# Patient Record
Sex: FEMALE | Race: BLACK OR AFRICAN AMERICAN | URBAN - METROPOLITAN AREA
[De-identification: names, ages, dates, MRNs, and addresses within clinical notes are randomized per-mention and may not be internally consistent; named-entity substitution may affect disease eponyms.]

---

## 2022-03-18 PROBLEM — D64.9 ANEMIA: Status: ACTIVE | Noted: 2021-03-07

## 2022-11-15 PROBLEM — Z76.89 ENCOUNTER TO ESTABLISH CARE WITH NEW DOCTOR: Status: ACTIVE | Noted: 2022-11-15

## 2022-11-16 PROBLEM — H53.8 BLURRING OF VISUAL IMAGE: Status: ACTIVE | Noted: 2022-11-03

## 2022-11-16 PROBLEM — I51.9 HEART DISEASE: Status: ACTIVE | Noted: 2022-11-16

## 2022-11-16 PROBLEM — E11.9 DIABETES MELLITUS (HCC): Status: ACTIVE | Noted: 2022-11-16

## 2022-11-30 PROBLEM — Z12.31 SCREENING MAMMOGRAM FOR BREAST CANCER: Status: ACTIVE | Noted: 2022-11-30

## 2023-02-28 DIAGNOSIS — Z13.220 SCREENING, LIPID: ICD-10-CM

## 2023-02-28 DIAGNOSIS — R00.2 PALPITATIONS: ICD-10-CM

## 2023-02-28 DIAGNOSIS — D64.9 ANEMIA, UNSPECIFIED TYPE: ICD-10-CM

## 2023-02-28 DIAGNOSIS — E11.9 TYPE 2 DIABETES MELLITUS WITHOUT COMPLICATION, WITHOUT LONG-TERM CURRENT USE OF INSULIN (HCC): ICD-10-CM

## 2023-02-28 LAB
ALBUMIN SERPL-MCNC: 3.8 G/DL (ref 3.5–5)
ALBUMIN/GLOB SERPL: 1.2 (ref 0.4–1.6)
ALP SERPL-CCNC: 82 U/L (ref 50–136)
ALT SERPL-CCNC: 35 U/L (ref 12–65)
ANION GAP SERPL CALC-SCNC: 0 MMOL/L (ref 2–11)
AST SERPL-CCNC: 28 U/L (ref 15–37)
BASOPHILS # BLD: 0 K/UL (ref 0–0.2)
BASOPHILS NFR BLD: 1 % (ref 0–2)
BILIRUB SERPL-MCNC: 0.6 MG/DL (ref 0.2–1.1)
BUN SERPL-MCNC: 14 MG/DL (ref 6–23)
CALCIUM SERPL-MCNC: 9.1 MG/DL (ref 8.3–10.4)
CHLORIDE SERPL-SCNC: 113 MMOL/L (ref 101–110)
CHOLEST SERPL-MCNC: 242 MG/DL
CO2 SERPL-SCNC: 28 MMOL/L (ref 21–32)
CREAT SERPL-MCNC: 0.7 MG/DL (ref 0.6–1)
CREAT UR-MCNC: 105 MG/DL
DIFFERENTIAL METHOD BLD: ABNORMAL
EOSINOPHIL # BLD: 0 K/UL (ref 0–0.8)
EOSINOPHIL NFR BLD: 1 % (ref 0.5–7.8)
ERYTHROCYTE [DISTWIDTH] IN BLOOD BY AUTOMATED COUNT: 13.7 % (ref 11.9–14.6)
EST. AVERAGE GLUCOSE BLD GHB EST-MCNC: 120 MG/DL
GLOBULIN SER CALC-MCNC: 3.2 G/DL (ref 2.8–4.5)
GLUCOSE SERPL-MCNC: 105 MG/DL (ref 65–100)
HBA1C MFR BLD: 5.8 % (ref 4.8–5.6)
HCT VFR BLD AUTO: 34 % (ref 35.8–46.3)
HDLC SERPL-MCNC: 49 MG/DL (ref 40–60)
HDLC SERPL: 4.9
HGB BLD-MCNC: 10.3 G/DL (ref 11.7–15.4)
IMM GRANULOCYTES # BLD AUTO: 0 K/UL (ref 0–0.5)
IMM GRANULOCYTES NFR BLD AUTO: 1 % (ref 0–5)
LDLC SERPL CALC-MCNC: 177.2 MG/DL
LYMPHOCYTES # BLD: 2.2 K/UL (ref 0.5–4.6)
LYMPHOCYTES NFR BLD: 49 % (ref 13–44)
MCH RBC QN AUTO: 22.9 PG (ref 26.1–32.9)
MCHC RBC AUTO-ENTMCNC: 30.3 G/DL (ref 31.4–35)
MCV RBC AUTO: 75.7 FL (ref 82–102)
MICROALBUMIN UR-MCNC: 0.77 MG/DL (ref 0–3)
MICROALBUMIN/CREAT UR-RTO: 7 MG/G (ref 0–30)
MONOCYTES # BLD: 0.4 K/UL (ref 0.1–1.3)
MONOCYTES NFR BLD: 8 % (ref 4–12)
NEUTS SEG # BLD: 1.8 K/UL (ref 1.7–8.2)
NEUTS SEG NFR BLD: 41 % (ref 43–78)
NRBC # BLD: 0 K/UL (ref 0–0.2)
PLATELET # BLD AUTO: 223 K/UL (ref 150–450)
PMV BLD AUTO: 11.1 FL (ref 9.4–12.3)
POTASSIUM SERPL-SCNC: 4 MMOL/L (ref 3.5–5.1)
PROT SERPL-MCNC: 7 G/DL (ref 6.3–8.2)
RBC # BLD AUTO: 4.49 M/UL (ref 4.05–5.2)
SODIUM SERPL-SCNC: 141 MMOL/L (ref 133–143)
TRIGL SERPL-MCNC: 79 MG/DL (ref 35–150)
TSH, 3RD GENERATION: 0.68 UIU/ML (ref 0.36–3.74)
VLDLC SERPL CALC-MCNC: 15.8 MG/DL (ref 6–23)
WBC # BLD AUTO: 4.4 K/UL (ref 4.3–11.1)

## 2023-04-19 NOTE — PROGRESS NOTES
Name: Sunshine Rodrigues  Date: 2023  YOB: 1965  LMP: No LMP recorded. Patient is postmenopausal.      Beulah Hernandez is a 56 y.o.   who is here for a annual exam. New type 2 DM, doing great! Birth control: postmenopausal  Menstrual status: none  Gyn Surgery: s/p RSO and s/p LSO    Health Maintenance:  Pap Smear: last pap in 22, pathology WNL  Any history of abnormal pap smear? yes  Mammo: last mammo in  (?), pathology WNL  HPV vaccine: not done  Colonoscopy: normal in 2018 (?)  Dexa scan: not applicable    Review of Systems   Constitutional: Negative. HENT: Negative. Eyes: Negative. Respiratory: Negative. Cardiovascular: Negative. Gastrointestinal: Negative. Endocrine: Negative. Genitourinary: Negative. Musculoskeletal: Negative. Skin: Negative. Allergic/Immunologic: Negative. Neurological: Negative. Hematological: Negative. Psychiatric/Behavioral: Negative. Negative for self-injury and suicidal ideas. Past Medical History:  No date: Abnormal Papanicolaou smear of cervix  No date: Endometriosis  No date: Type 2 diabetes mellitus without complication ([facility] [address]     Past Surgical History:  No date: OVARY REMOVAL; Right  No date: SALPINGECTOMY       Current Outpatient Medications:     glipiZIDE (GLUCOTROL) 5 MG tablet, Take 1 tablet by mouth 2 times daily, Disp: 60 tablet, Rfl: 3    Family Cancer History:   Cancer-related family history includes Breast Cancer (age of onset: 79) in her mother. There is no history of Colon Cancer or Ovarian Cancer. Social History:  reports that she has never smoked. She has never used smokeless tobacco. She reports that she does not currently use alcohol. She reports that she does not use drugs. Ob History:  No obstetric history on file. Sexual History:  reports being sexually active and has had partner(s) who are male. She reports using the following method of birth control/protection: None.     PHYSICAL

## 2023-04-20 ENCOUNTER — OFFICE VISIT (OUTPATIENT)
Dept: OBGYN CLINIC | Age: 58
End: 2023-04-20
Payer: COMMERCIAL

## 2023-04-20 VITALS — WEIGHT: 157 LBS | HEIGHT: 63 IN | BODY MASS INDEX: 27.82 KG/M2

## 2023-04-20 DIAGNOSIS — Z12.4 PAP SMEAR FOR CERVICAL CANCER SCREENING: ICD-10-CM

## 2023-04-20 DIAGNOSIS — Z01.419 WELL WOMAN EXAM WITH ROUTINE GYNECOLOGICAL EXAM: Primary | ICD-10-CM

## 2023-04-20 DIAGNOSIS — Z12.31 ENCOUNTER FOR SCREENING MAMMOGRAM FOR MALIGNANT NEOPLASM OF BREAST: ICD-10-CM

## 2023-04-20 DIAGNOSIS — Z11.51 SCREENING FOR HPV (HUMAN PAPILLOMAVIRUS): ICD-10-CM

## 2023-04-20 PROCEDURE — 99396 PREV VISIT EST AGE 40-64: CPT | Performed by: OBSTETRICS & GYNECOLOGY

## 2023-04-20 ASSESSMENT — ENCOUNTER SYMPTOMS
EYES NEGATIVE: 1
GASTROINTESTINAL NEGATIVE: 1
ALLERGIC/IMMUNOLOGIC NEGATIVE: 1
RESPIRATORY NEGATIVE: 1

## 2023-05-01 LAB
CYTOLOGIST CVX/VAG CYTO: ABNORMAL
CYTOLOGY CVX/VAG DOC THIN PREP: ABNORMAL
HPV APTIMA: NEGATIVE
HPV GENOTYPE REFLEX: ABNORMAL
Lab: ABNORMAL
PATH REPORT.FINAL DX SPEC: ABNORMAL
PATHOLOGIST CVX/VAG CYTO: ABNORMAL
PATHOLOGIST PROVIDED ICD: ABNORMAL
RECOM F/U CVX/VAG CYTO: ABNORMAL
STAT OF ADQ CVX/VAG CYTO-IMP: ABNORMAL

## 2023-06-19 ENCOUNTER — TELEPHONE (OUTPATIENT)
Dept: PRIMARY CARE CLINIC | Facility: CLINIC | Age: 58
End: 2023-06-19

## 2023-07-03 ENCOUNTER — COMMUNITY OUTREACH (OUTPATIENT)
Dept: PRIMARY CARE CLINIC | Facility: CLINIC | Age: 58
End: 2023-07-03

## 2023-08-22 ENCOUNTER — INITIAL CONSULT (OUTPATIENT)
Age: 58
End: 2023-08-22
Payer: COMMERCIAL

## 2023-08-22 VITALS
BODY MASS INDEX: 28.35 KG/M2 | SYSTOLIC BLOOD PRESSURE: 132 MMHG | HEART RATE: 65 BPM | DIASTOLIC BLOOD PRESSURE: 80 MMHG | HEIGHT: 63 IN | WEIGHT: 160 LBS

## 2023-08-22 DIAGNOSIS — E78.2 MIXED HYPERLIPIDEMIA: ICD-10-CM

## 2023-08-22 DIAGNOSIS — R00.2 HEART PALPITATIONS: Primary | ICD-10-CM

## 2023-08-22 PROCEDURE — 99244 OFF/OP CNSLTJ NEW/EST MOD 40: CPT | Performed by: INTERNAL MEDICINE

## 2023-08-22 PROCEDURE — 93000 ELECTROCARDIOGRAM COMPLETE: CPT | Performed by: INTERNAL MEDICINE

## 2023-08-22 ASSESSMENT — ENCOUNTER SYMPTOMS
ORTHOPNEA: 0
BLURRED VISION: 0
ALLERGIC/IMMUNOLOGIC NEGATIVE: 1
SHORTNESS OF BREATH: 0
EYES NEGATIVE: 1
LEFT EYE: 0
GASTROINTESTINAL NEGATIVE: 1
COUGH: 0
RIGHT EYE: 0
RESPIRATORY NEGATIVE: 1

## 2023-08-22 NOTE — PROGRESS NOTES
[address] [place]     [address] [facility], [address] [place] [address]      Patient:  Peri Carlos  1965       SUBJECTIVE:  Peri Carlos is a  56 y.o. female seen for a visit regarding the following:     Chief Complaint   Patient presents with    Consultation    Palpitations     CC: palpitations    HPI:   56 y.o. female  with a history of HLD, DM who is here for palpitations consult    Patient reports that  she has been having palpitations for many years, since Nov 2022 when she was diagnosed with DM. Notices most when she gets into bed. Feels irregular, fast. Waits and symptoms stop. Lasts a few minutes at a time she reports. No obvious alleviating or aggravating factors identified. Denies associated shortness of breath, chest pain or chest pressure, dizziness, lightheadedness, syncopal events, abdominal pain, nausea, vomiting, diaphoresis. Symptoms have been occurring nearly every day. No ER visits or hospitalizations for these complaints. States she has been doing well with taking her glipizide, sugars have been well controlled. No other complaints at this time. Cardiovascular Testing:  - none     Past medical history, past surgical history, family history, social history, and medications were all reviewed with the patient today and updated as necessary.          Patient Active Problem List    Diagnosis Date Noted    Palpitations 02/28/2023     Priority: Medium    Screening mammogram for breast cancer 11/30/2022     Priority: Medium    Diabetes mellitus ([address]) 11/16/2022     Priority: Medium    Heart disease 11/16/2022     Priority: Medium    Blurring of visual image 11/03/2022     Priority: Medium    Anemia 03/07/2021       Family History   Problem Relation Age of Onset    Breast Cancer Mother 79    Diabetes Mother     Diabetes type 2  Sister     Diabetes Sister     Diabetes Sister     Colon Cancer Neg Hx     Ovarian Cancer Neg Hx        Social History     Tobacco Use    Smoking

## 2023-08-23 ENCOUNTER — OFFICE VISIT (OUTPATIENT)
Dept: PRIMARY CARE CLINIC | Facility: CLINIC | Age: 58
End: 2023-08-23
Payer: COMMERCIAL

## 2023-08-23 VITALS
DIASTOLIC BLOOD PRESSURE: 76 MMHG | HEART RATE: 78 BPM | RESPIRATION RATE: 16 BRPM | OXYGEN SATURATION: 97 % | BODY MASS INDEX: 28.95 KG/M2 | WEIGHT: 163.4 LBS | HEIGHT: 63 IN | SYSTOLIC BLOOD PRESSURE: 132 MMHG

## 2023-08-23 DIAGNOSIS — E78.00 ELEVATED LDL CHOLESTEROL LEVEL: ICD-10-CM

## 2023-08-23 DIAGNOSIS — Z12.11 SCREENING FOR COLON CANCER: ICD-10-CM

## 2023-08-23 DIAGNOSIS — E11.9 TYPE 2 DIABETES MELLITUS WITHOUT COMPLICATION, WITHOUT LONG-TERM CURRENT USE OF INSULIN (HCC): ICD-10-CM

## 2023-08-23 DIAGNOSIS — Z82.49 FAMILY HISTORY OF CORONARY ARTERY DISEASE: ICD-10-CM

## 2023-08-23 DIAGNOSIS — G56.01 CARPAL TUNNEL SYNDROME OF RIGHT WRIST: Primary | ICD-10-CM

## 2023-08-23 PROCEDURE — 99214 OFFICE O/P EST MOD 30 MIN: CPT | Performed by: FAMILY MEDICINE

## 2023-08-23 PROCEDURE — 3044F HG A1C LEVEL LT 7.0%: CPT | Performed by: FAMILY MEDICINE

## 2023-08-23 ASSESSMENT — ENCOUNTER SYMPTOMS
NAUSEA: 0
SHORTNESS OF BREATH: 0
ABDOMINAL PAIN: 0
DIARRHEA: 0
COUGH: 0
VOMITING: 0

## 2023-08-23 NOTE — ASSESSMENT & PLAN NOTE
Elevated cholesterol in the past, this combined with her diabetes and known family history of heart disease, recommend coronary artery calcium score. Order placed, will have them contact patient.

## 2023-08-23 NOTE — ASSESSMENT & PLAN NOTE
Patient reports taking glipizide once a day as she had some low readings when she took it twice a day. States that she is doing well with normal blood sugar readings on glipizide in the morning. Normal diabetic foot exam.  Plan to check labs when she returns for her next visit.

## 2023-08-23 NOTE — PROGRESS NOTES
Charles DO Destiney Daniels, [address], [address] [facility]  [phone]         ASSESSMENT AND PLAN    Problem List Items Addressed This Visit          Endocrine    Diabetes mellitus ([address])      Patient reports taking glipizide once a day as she had some low readings when she took it twice a day. States that she is doing well with normal blood sugar readings on glipizide in the morning. Normal diabetic foot exam.  Plan to check labs when she returns for her next visit. Relevant Orders    CT CARDIAC CALCIUM SCORING       Nervous and Auditory    Carpal tunnel syndrome of right wrist - Primary     Right wrist pain and difficulty when grabbing things with her hands. Positive Phalen sign on exam today. Discussed use of Advil or ice to help with discomfort. Discussed use of cock up wrist splint at bedtime to help with wrist pain. Will refer to hand surgery, Dr. Ricardo Soto, for further evaluation and management. Relevant Orders    Raeann Tinoco MD, [address]       Other    Elevated LDL cholesterol level     Elevated cholesterol in the past, this combined with her diabetes and known family history of heart disease, recommend coronary artery calcium score. Order placed, will have them contact patient. Relevant Orders    CT CARDIAC CALCIUM SCORING     Other Visit Diagnoses       Family history of coronary artery disease        Relevant Orders    CT CARDIAC CALCIUM SCORING    Screening for colon cancer        Relevant Orders    [address] [facility]             The diagnoses and plan were discussed with the patient, who verbalizes understanding and agrees with plan. All questions answered. Chief Complaint    Chief Complaint   Patient presents with    Follow-up     Post cardiology       HISTORY OF PRESENT ILLNESS    56 y.o. female with DM presents today for follow up.   Last seen by

## 2023-08-23 NOTE — ASSESSMENT & PLAN NOTE
Right wrist pain and difficulty when grabbing things with her hands. Positive Phalen sign on exam today. Discussed use of Advil or ice to help with discomfort. Discussed use of cock up wrist splint at bedtime to help with wrist pain. Will refer to hand surgery, Dr. Soraida Stringer, for further evaluation and management.

## 2023-08-23 NOTE — PATIENT INSTRUCTIONS
IT WAS GREAT TO SEE YOU TODAY! LOOK FOR COCK-UP WRIST SPLINTS TO WEAR AT BEDTIME TO HELP WITH YOUR WRIST PAIN. YOU CAN ALSO TRY TAKING ADVIL OR IBUPROFEN AND TRY ICE AFTER WORK TO SEE IF THIS HELPS. I HAVE REFERRED YOU TO HAND SURGERY FOR YOUR CARPAL TUNNEL SYNDROME, AS WELL AS GENERAL SURGERY FOR A COLONOSCOPY. EACH OFFICE WILL CONTACT YOU WITH AN APPOINTMENT. THEY WILL CALL YOU TO SCHEDULE YOUR CORONARY ARTERY CALCIUM SCORE. PLEASE TAKE ALL MEDICATION AS DISCUSSED.    ~CONSIDER TAKING HALF A GLIPIZIDE TWICE A DAY TO COVER ANY ELEVATED BLOOD SUGARS THAT MAY OCCUR AT NIGHT.     I WILL SEE YOU AGAIN IN 6 MONTHS BUT PLEASE CALL WITH CONCERNS [phone]

## 2023-08-31 ENCOUNTER — OFFICE VISIT (OUTPATIENT)
Dept: ORTHOPEDIC SURGERY | Age: 58
End: 2023-08-31

## 2023-08-31 VITALS — BODY MASS INDEX: 28.35 KG/M2 | WEIGHT: 160 LBS | HEIGHT: 63 IN

## 2023-08-31 DIAGNOSIS — M25.531 RIGHT WRIST PAIN: Primary | ICD-10-CM

## 2023-08-31 DIAGNOSIS — M25.539 PAIN OF ULNAR SIDE OF WRIST: ICD-10-CM

## 2023-08-31 NOTE — PROGRESS NOTES
Patient was fit for a Wrist/Forearm lacer for patients right elbow pain. Patient is instructed the brace should fit nicely with in the palm and right below the knuckles on the dorsal side of the hand. The patient was aware the brace should fit snuggly around the wrist/forearm area. The strap placed through the thumb and first finger should fit comfortably to insure the brace does not slide or shift. Patient read and signed documenting they understand and agree to [facility]'s current DME return policy.

## 2023-08-31 NOTE — PROGRESS NOTES
Orthopaedic Hand Clinic Note    Name: Marguerite Greenberg  YOB: 1965  Gender: female  MRN: [id number]      CC: Patient referred for evaluation of upper extremity pain    HPI: Marguerite Greenberg is a 56 y.o. female with a chief complaint of right wrist pain. She initially describes it at circumferential, but notes that the worst pain is located ulnarly. She has no history of injury. Pain has been present for 2 weeks. She has been using a compression sleeve. She denies numbness or tingling. ROS/Meds/PSH/PMH/FH/SH: I personally reviewed the patients standard intake form. Pertinents are discussed in the HPI    Physical Examination:    Musculoskeletal Exam:  Examination on the right upper extremity demonstrates cap refill < 5 seconds in all fingers, there is no swelling there is no tenderness to palpation at the first dorsal compartment, site of intersection syndrome, throughout the dorsal or volar  aspect of the carpus. There is mild tenderness at the ulnar fovea. There is pain with ulno carpal grind. There is no DRUJ instability. Finkelsteins is negative. Debo Shaun test is negative. Light touch sensation is intact throughout    Imaging / Electrodiagnostic Tests:     Wrist XR: AP, Lateral, Oblique views     Clinical Indication:  1. Right wrist pain    2. Pain of ulnar side of wrist           Report: AP, lateral, oblique x-ray of the right wrist demonstrates mild ulnar positive variance    Impression: as above     Nohemy Burton MD         Assessment:     ICD-10-CM    1. Right wrist pain  M25.531 XR WRIST RIGHT (MIN 3 VIEWS)      2. Pain of ulnar side of wrist  M25.539 Ambulatory Referral to [facility]          Plan:   We discussed the diagnosis and different treatment options. We discussed observation, therapy, antiinflammatory medications and other pertinent treatment modalities. After discussing in detail the patient elects to proceed with wrist brace, NSAIDs. I offered cortisone injection but she declined .

## 2023-09-13 RX ORDER — METOPROLOL SUCCINATE 25 MG/1
12.5 TABLET, EXTENDED RELEASE ORAL DAILY
Qty: 45 TABLET | Refills: 3 | Status: SHIPPED | OUTPATIENT
Start: 2023-09-13

## 2023-09-13 NOTE — TELEPHONE ENCOUNTER
----- Message from Nicki Kim DO sent at 9/12/2023  5:00 PM EDT -----  Please call the patient regarding their result. Heart monitor demonstrates episodes of a heart rhythm called SVT. As well as extra beats called PVCs approximately 3% of her time that she wore the monitor. Please have her start Toprol 12.5 mg oral once daily, this medicine may prevent her symptoms of palpitations and decreased extra beats. Please have her stop this medicine if she develops dizziness lightheadedness or severe fatigue. Please have her call with any questions.

## 2023-09-13 NOTE — TELEPHONE ENCOUNTER
Requested Prescriptions     Pending Prescriptions Disp Refills    metoprolol succinate (TOPROL XL) 25 MG extended release tablet 45 tablet 3     Sig: Take 0.5 tablets by mouth daily

## 2023-09-19 NOTE — PROGRESS NOTES
Name: Betty Kam      MRN: [id number]       : 1965       Age: 56 y.o. Sex: female        Flor Spicer DO       CC:  No chief complaint on file. HPI:  The patient is referred here for a colonoscopy by Dr. Sandrine Laguerre. The patient had a colonoscopy 20 years ago due to \"bowel issues\" at the time, but it was negative. Family hx of colon cancer No      Previous colonoscopy Yes   BRBPR No   Melena No   Loss of appetite No   Weight loss No      Change in bowel habits No       HISTORY:    Past Medical History:   Diagnosis Date    Abnormal Papanicolaou smear of cervix     Endometriosis     Type 2 diabetes mellitus without complication (HCC)      Past Surgical History:   Procedure Laterality Date    OVARY REMOVAL Right     SALPINGECTOMY       Prior to Admission medications    Medication Sig Start Date End Date Taking? Authorizing Provider   metoprolol succinate (TOPROL XL) 25 MG extended release tablet Take 0.5 tablets by mouth daily 23   Cici Ordonez DO   glipiZIDE (GLUCOTROL) 5 MG tablet Take 1 tablet by mouth 2 times daily 23   Rylan Cordova DO     Social History     Tobacco Use    Smoking status: Never    Smokeless tobacco: Never   Vaping Use    Vaping Use: Never used   Substance Use Topics    Alcohol use: Yes     Comment: occ    Drug use: Never     Family History   Problem Relation Age of Onset    Breast Cancer Mother 79    Diabetes Mother     Diabetes type 2  Sister     Diabetes Sister     Diabetes Sister     Colon Cancer Neg Hx     Ovarian Cancer Neg Hx      . No Known Allergies    Physical Exam:     There were no vitals taken for this visit. General: Alert, oriented, cooperative black female in no acute distress. Resp: Breathing is  non-labored. Lungs clear to auscultation without wheezing or rhonchi   CV: RRR. No murmurs, rubs or gallops appreciated. Abd: soft non-tender and non-distended without peritoneal signs.  +bs    Extremities: No clubbing,

## 2023-09-21 ENCOUNTER — PREP FOR PROCEDURE (OUTPATIENT)
Dept: SURGERY | Age: 58
End: 2023-09-21

## 2023-09-21 ENCOUNTER — OFFICE VISIT (OUTPATIENT)
Dept: SURGERY | Age: 58
End: 2023-09-21

## 2023-09-21 VITALS
WEIGHT: 163.8 LBS | HEIGHT: 63 IN | SYSTOLIC BLOOD PRESSURE: 101 MMHG | HEART RATE: 85 BPM | BODY MASS INDEX: 29.02 KG/M2 | DIASTOLIC BLOOD PRESSURE: 65 MMHG

## 2023-09-21 DIAGNOSIS — Z12.11 ENCOUNTER FOR SCREENING COLONOSCOPY: Primary | ICD-10-CM

## 2023-09-21 DIAGNOSIS — Z12.11 ENCOUNTER FOR SCREENING COLONOSCOPY: ICD-10-CM

## 2023-09-29 NOTE — H&P
Name: Betty Kam      MRN: [id number]       : 1965       Age: 56 y.o. Sex: female        Flor Spicer DO       CC:  No chief complaint on file. HPI:  The patient is referred here for a colonoscopy by Dr. Sandrine Laguerre. The patient had a colonoscopy 20 years ago due to \"bowel issues\" at the time, but it was negative. Family hx of colon cancer No      Previous colonoscopy Yes   BRBPR No   Melena No   Loss of appetite No   Weight loss No      Change in bowel habits No       HISTORY:    Past Medical History:   Diagnosis Date    Abnormal Papanicolaou smear of cervix     Endometriosis     Type 2 diabetes mellitus without complication (HCC)      Past Surgical History:   Procedure Laterality Date    OVARY REMOVAL Right     SALPINGECTOMY       Prior to Admission medications    Medication Sig Start Date End Date Taking? Authorizing Provider   metoprolol succinate (TOPROL XL) 25 MG extended release tablet Take 0.5 tablets by mouth daily 23   Cici Ordonez DO   glipiZIDE (GLUCOTROL) 5 MG tablet Take 1 tablet by mouth 2 times daily 23   Rylan Cordova DO     Social History     Tobacco Use    Smoking status: Never    Smokeless tobacco: Never   Vaping Use    Vaping Use: Never used   Substance Use Topics    Alcohol use: Yes     Comment: occ    Drug use: Never     Family History   Problem Relation Age of Onset    Breast Cancer Mother 79    Diabetes Mother     Diabetes type 2  Sister     Diabetes Sister     Diabetes Sister     Colon Cancer Neg Hx     Ovarian Cancer Neg Hx      . No Known Allergies    Physical Exam:     There were no vitals taken for this visit. General: Alert, oriented, cooperative black female in no acute distress. Resp: Breathing is  non-labored. Lungs clear to auscultation without wheezing or rhonchi   CV: RRR. No murmurs, rubs or gallops appreciated. Abd: soft non-tender and non-distended without peritoneal signs.  +bs    Extremities: No clubbing, cyanosis or edema. Strength intact. Assessment/Plan:  Karina Puckett is a 56 y.o. female who is here for a colonoscopy as a screening tool. 1. Off ASA for one week, if on aspirin    2. Bowel prep    3. Colonoscopy with MAC. I went through the risks of bleeding, perforation of the colon and cardiopulmonary problems that can develop with the use of anesthesia.     Alvino Burton MD    [facility]   9/29/2023  11:54 AM

## 2023-10-02 ENCOUNTER — TELEPHONE (OUTPATIENT)
Dept: SURGERY | Age: 58
End: 2023-10-02

## 2023-10-02 NOTE — TELEPHONE ENCOUNTER
[address] [facility] scheduled for: 10/6/23      *Has patient picked up medications Miralax, Dulcolax, Gatorade or drink of choice-not red)? Yes       *Discussed instructions for how to take these and instructions for the week prior to your procedure? Yes       *Discussed instructions for the next couple of days? Yes      *Patient reminded of location of procedure and time of arrival?     Yes       *Who will be bringing patient and staying at the hospital with them during your procedure? yes      *Informed patient that they should receive a call from the hospital as well to pre-register them for this procedure? Yes       *Informed them of contact info if needed to cancel or reschedule this procedure?      Yes

## 2023-10-04 NOTE — PERIOP NOTE
Patient verified name, , and procedure. Type: 1a; abbreviated assessment per anesthesia guidelines    Labs per anesthesia: POC glucose    Chart flagged to anesthesia review chart. Pt with palpitation- work up completed with cardiology. Holter monitor completed. Pt started on Metoprolol. ECHO ordered 10.13.23. Chart flagged to have anesthesia review. Instructed pt that they will be notified the day before their procedure by the GI Lab for time of arrival if their procedure is [address] and Pre-op for [facility] cases. Arrival times should be called by 5 pm. If no phone is received the patient should contact their respective hospital. The GI lab telephone number is [phone] and ES Pre-op is [phone]. Follow diet and prep instructions per office including NPO status. If patient has NOT received instructions from office patient is advised to call surgeon office, verbalizes understanding. Bath or shower the night before and the am of surgery with non-moisturizing soap. No lotions, oils, powders, cologne on skin. No make up, eye make up or jewelry. Wear loose fitting comfortable, clean clothing. Must have adult present in building the entire time . Medications for the day of procedure Toprol. patient to hold all vitamins, supplements, and NSAIDs per anesthesia guidelines. The following discharge instructions reviewed with patient: medication given during procedure may cause drowsiness for several hours, therefore, do not drive or operate machinery for remainder of the day. You may not drink alcohol on the day of your procedure, please resume regular diet and activity unless otherwise directed. You may experience abdominal distention for several hours that is relieved by the passage of gas. Contact your physician if you have any of the following: fever or chills, severe abdominal pain or excessive amount of bleeding or a large amount when having a bowel movement.  Occasional specks of blood with bowel movement would not be unusual.

## 2023-10-05 ENCOUNTER — ANESTHESIA EVENT (OUTPATIENT)
Dept: ENDOSCOPY | Age: 58
End: 2023-10-05
Payer: COMMERCIAL

## 2023-10-05 RX ORDER — ONDANSETRON 2 MG/ML
4 INJECTION INTRAMUSCULAR; INTRAVENOUS
Status: CANCELLED | OUTPATIENT
Start: 2023-10-05 | End: 2023-10-06

## 2023-10-05 RX ORDER — SODIUM CHLORIDE 9 MG/ML
INJECTION, SOLUTION INTRAVENOUS PRN
Status: CANCELLED | OUTPATIENT
Start: 2023-10-05

## 2023-10-05 RX ORDER — SODIUM CHLORIDE 0.9 % (FLUSH) 0.9 %
5-40 SYRINGE (ML) INJECTION PRN
Status: CANCELLED | OUTPATIENT
Start: 2023-10-05

## 2023-10-05 RX ORDER — SODIUM CHLORIDE 0.9 % (FLUSH) 0.9 %
5-40 SYRINGE (ML) INJECTION EVERY 12 HOURS SCHEDULED
Status: CANCELLED | OUTPATIENT
Start: 2023-10-05

## 2023-10-06 ENCOUNTER — ANESTHESIA (OUTPATIENT)
Dept: ENDOSCOPY | Age: 58
End: 2023-10-06
Payer: COMMERCIAL

## 2023-10-06 ENCOUNTER — HOSPITAL ENCOUNTER (OUTPATIENT)
Age: 58
Setting detail: OUTPATIENT SURGERY
Discharge: HOME | End: 2023-10-06
Attending: SURGERY | Admitting: SURGERY
Payer: COMMERCIAL

## 2023-10-06 VITALS
BODY MASS INDEX: 28.88 KG/M2 | TEMPERATURE: 97.7 F | OXYGEN SATURATION: 98 % | DIASTOLIC BLOOD PRESSURE: 67 MMHG | RESPIRATION RATE: 17 BRPM | HEIGHT: 63 IN | SYSTOLIC BLOOD PRESSURE: 116 MMHG | WEIGHT: 163 LBS | HEART RATE: 68 BPM

## 2023-10-06 LAB
GLUCOSE BLD STRIP.AUTO-MCNC: 106 MG/DL (ref 65–100)
SERVICE CMNT-IMP: ABNORMAL

## 2023-10-06 PROCEDURE — 3700000001 HC ADD 15 MINUTES (ANESTHESIA): Performed by: SURGERY

## 2023-10-06 PROCEDURE — 6360000002 HC RX W HCPCS: Performed by: NURSE ANESTHETIST, CERTIFIED REGISTERED

## 2023-10-06 PROCEDURE — 82962 GLUCOSE BLOOD TEST: CPT

## 2023-10-06 PROCEDURE — 2500000003 HC RX 250 WO HCPCS: Performed by: NURSE ANESTHETIST, CERTIFIED REGISTERED

## 2023-10-06 PROCEDURE — 45380 COLONOSCOPY AND BIOPSY: CPT | Performed by: SURGERY

## 2023-10-06 PROCEDURE — 88305 TISSUE EXAM BY PATHOLOGIST: CPT

## 2023-10-06 PROCEDURE — 2709999900 HC NON-CHARGEABLE SUPPLY: Performed by: SURGERY

## 2023-10-06 PROCEDURE — 7100000010 HC PHASE II RECOVERY - FIRST 15 MIN: Performed by: SURGERY

## 2023-10-06 PROCEDURE — 3700000000 HC ANESTHESIA ATTENDED CARE: Performed by: SURGERY

## 2023-10-06 PROCEDURE — 7100000011 HC PHASE II RECOVERY - ADDTL 15 MIN: Performed by: SURGERY

## 2023-10-06 PROCEDURE — 3609010600 HC COLONOSCOPY POLYPECTOMY SNARE/COLD BIOPSY: Performed by: SURGERY

## 2023-10-06 PROCEDURE — 2580000003 HC RX 258: Performed by: ANESTHESIOLOGY

## 2023-10-06 RX ORDER — SODIUM CHLORIDE 0.9 % (FLUSH) 0.9 %
5-40 SYRINGE (ML) INJECTION PRN
Status: DISCONTINUED | OUTPATIENT
Start: 2023-10-06 | End: 2023-10-06 | Stop reason: HOSPADM

## 2023-10-06 RX ORDER — SODIUM CHLORIDE, SODIUM LACTATE, POTASSIUM CHLORIDE, CALCIUM CHLORIDE 600; 310; 30; 20 MG/100ML; MG/100ML; MG/100ML; MG/100ML
INJECTION, SOLUTION INTRAVENOUS CONTINUOUS
Status: DISCONTINUED | OUTPATIENT
Start: 2023-10-06 | End: 2023-10-06 | Stop reason: HOSPADM

## 2023-10-06 RX ORDER — PROPOFOL 10 MG/ML
INJECTION, EMULSION INTRAVENOUS PRN
Status: DISCONTINUED | OUTPATIENT
Start: 2023-10-06 | End: 2023-10-06 | Stop reason: SDUPTHER

## 2023-10-06 RX ORDER — LIDOCAINE HYDROCHLORIDE 20 MG/ML
INJECTION, SOLUTION EPIDURAL; INFILTRATION; INTRACAUDAL; PERINEURAL PRN
Status: DISCONTINUED | OUTPATIENT
Start: 2023-10-06 | End: 2023-10-06 | Stop reason: SDUPTHER

## 2023-10-06 RX ORDER — LIDOCAINE HYDROCHLORIDE 10 MG/ML
1 INJECTION, SOLUTION INFILTRATION; PERINEURAL
Status: DISCONTINUED | OUTPATIENT
Start: 2023-10-06 | End: 2023-10-06 | Stop reason: HOSPADM

## 2023-10-06 RX ORDER — SODIUM CHLORIDE 0.9 % (FLUSH) 0.9 %
5-40 SYRINGE (ML) INJECTION EVERY 12 HOURS SCHEDULED
Status: DISCONTINUED | OUTPATIENT
Start: 2023-10-06 | End: 2023-10-06 | Stop reason: HOSPADM

## 2023-10-06 RX ORDER — SODIUM CHLORIDE 9 MG/ML
INJECTION, SOLUTION INTRAVENOUS PRN
Status: DISCONTINUED | OUTPATIENT
Start: 2023-10-06 | End: 2023-10-06 | Stop reason: HOSPADM

## 2023-10-06 RX ADMIN — LIDOCAINE HYDROCHLORIDE 100 MG: 20 INJECTION, SOLUTION EPIDURAL; INFILTRATION; INTRACAUDAL; PERINEURAL at 08:52

## 2023-10-06 RX ADMIN — PROPOFOL 200 MCG/KG/MIN: 10 INJECTION, EMULSION INTRAVENOUS at 08:53

## 2023-10-06 RX ADMIN — SODIUM CHLORIDE, POTASSIUM CHLORIDE, SODIUM LACTATE AND CALCIUM CHLORIDE: 600; 310; 30; 20 INJECTION, SOLUTION INTRAVENOUS at 08:07

## 2023-10-06 RX ADMIN — PROPOFOL 80 MG: 10 INJECTION, EMULSION INTRAVENOUS at 08:52

## 2023-10-06 ASSESSMENT — PAIN - FUNCTIONAL ASSESSMENT
PAIN_FUNCTIONAL_ASSESSMENT: 0-10

## 2023-10-06 NOTE — OP NOTE
[address] [facility]  OPERATIVE REPORT    Name:  Betty Kam  MR#:  [id number]  :  1965  ACCOUNT #:  [de-identified]  DATE OF SERVICE:  10/06/2023    PREOPERATIVE DIAGNOSIS:  Request for screening colonoscopy. POSTOPERATIVE DIAGNOSES:  1. Two rectal polyps which were found and excised. 2.  Descending and sigmoid colon diverticular disease without acute diverticulitis. 3.  Angulated splenic flexure. I had to use a pediatric scope to get around this. 4.  Good prep. PROCEDURE PERFORMED:  A colonoscopy with polypectomy x2. SURGEON:  Blu Sears MD    ASSISTANT:  None. ANESTHESIA:  Monitored anesthesia care with Dr. Walker Siu and William Phillips, the BRITTNEE. COMPLICATIONS:  None. SPECIMENS REMOVED:  Rectal polyp x2. IMPLANTS:  None. ESTIMATED BLOOD LOSS:  Less than 2 mL. DRAINS:  None. HISTORY:  This is a 65-year-old female who I was asked to see by Dr. Sandrine Laguerre. The patient had a colonoscopy 20 years ago for \"bowel issues\" which was negative. She was scheduled for the procedure today. She underwent a successful bowel prep on 10/05/2023 and came into the [address] [place] [address] for the procedure today. PROCEDURE:  She was seen in the preop area with her , Zane Mccollum and then transported to room #4 at the [address] [place] [address]. She was placed on a stretcher in left lateral decubitus position. Oxygen via nasal cannula was administered. The O2 sats and vital signs were monitored throughout the procedure by the Anesthesia staff. A time-out was done identifying the patient, procedure of a colonoscopy, her birth date of 1965 and the fact that Dr. Natalie Miranda was doing the procedure. Once everyone agreed with the time-out, we began. Monitored anesthesia care was done by the Anesthesia staff. Once the sedative effect had taken hold, an adult colonoscope was advanced from the anus.   I was able to get to the splenic flexure, but the

## 2023-10-06 NOTE — INTERVAL H&P NOTE
Update History & Physical    The patient's History and Physical of 9/29/23 was reviewed with the patient and I examined the patient. There was no change. The surgical site was confirmed by the patient and me. Plan: The risks, benefits, expected outcome, and alternative to the recommended procedure have been discussed with the patient. Patient understands and wants to proceed with the procedure.      Electronically signed by Hosea Briseno MD on 10/6/2023 at 7:36 AM

## 2023-10-06 NOTE — OR NURSING
VSS. Discharge instructions reviewed with patient and  and copy of instructions sent home with patient. Questions answered. Patient transferred out via wc by Avinash Banegas. IV discontinued prior to discharge.

## 2023-10-21 PROBLEM — Z12.11 ENCOUNTER FOR SCREENING COLONOSCOPY: Status: RESOLVED | Noted: 2023-09-21 | Resolved: 2023-10-21

## 2024-03-13 ENCOUNTER — OFFICE VISIT (OUTPATIENT)
Dept: PRIMARY CARE CLINIC | Facility: CLINIC | Age: 59
End: 2024-03-13
Payer: COMMERCIAL

## 2024-03-13 VITALS
HEIGHT: 63 IN | DIASTOLIC BLOOD PRESSURE: 78 MMHG | OXYGEN SATURATION: 97 % | BODY MASS INDEX: 28.7 KG/M2 | HEART RATE: 71 BPM | WEIGHT: 162 LBS | SYSTOLIC BLOOD PRESSURE: 132 MMHG

## 2024-03-13 DIAGNOSIS — R14.0 ABDOMINAL BLOATING: ICD-10-CM

## 2024-03-13 DIAGNOSIS — E11.9 TYPE 2 DIABETES MELLITUS WITHOUT COMPLICATION, WITHOUT LONG-TERM CURRENT USE OF INSULIN (HCC): Primary | ICD-10-CM

## 2024-03-13 DIAGNOSIS — E78.00 ELEVATED LDL CHOLESTEROL LEVEL: ICD-10-CM

## 2024-03-13 PROCEDURE — 99213 OFFICE O/P EST LOW 20 MIN: CPT | Performed by: FAMILY MEDICINE

## 2024-03-13 RX ORDER — GLIPIZIDE 5 MG/1
5 TABLET ORAL 2 TIMES DAILY
Qty: 180 TABLET | Refills: 3 | Status: SHIPPED | OUTPATIENT
Start: 2024-03-13

## 2024-03-13 SDOH — ECONOMIC STABILITY: HOUSING INSECURITY
IN THE LAST 12 MONTHS, WAS THERE A TIME WHEN YOU DID NOT HAVE A STEADY PLACE TO SLEEP OR SLEPT IN A SHELTER (INCLUDING NOW)?: PATIENT DECLINED

## 2024-03-13 SDOH — ECONOMIC STABILITY: FOOD INSECURITY: WITHIN THE PAST 12 MONTHS, THE FOOD YOU BOUGHT JUST DIDN'T LAST AND YOU DIDN'T HAVE MONEY TO GET MORE.: PATIENT DECLINED

## 2024-03-13 SDOH — ECONOMIC STABILITY: INCOME INSECURITY: HOW HARD IS IT FOR YOU TO PAY FOR THE VERY BASICS LIKE FOOD, HOUSING, MEDICAL CARE, AND HEATING?: PATIENT DECLINED

## 2024-03-13 SDOH — ECONOMIC STABILITY: FOOD INSECURITY: WITHIN THE PAST 12 MONTHS, YOU WORRIED THAT YOUR FOOD WOULD RUN OUT BEFORE YOU GOT MONEY TO BUY MORE.: PATIENT DECLINED

## 2024-03-13 ASSESSMENT — PATIENT HEALTH QUESTIONNAIRE - PHQ9
2. FEELING DOWN, DEPRESSED OR HOPELESS: 0
SUM OF ALL RESPONSES TO PHQ QUESTIONS 1-9: 0
1. LITTLE INTEREST OR PLEASURE IN DOING THINGS: 0
2. FEELING DOWN, DEPRESSED OR HOPELESS: 0
SUM OF ALL RESPONSES TO PHQ9 QUESTIONS 1 & 2: 0
SUM OF ALL RESPONSES TO PHQ9 QUESTIONS 1 & 2: 0
SUM OF ALL RESPONSES TO PHQ QUESTIONS 1-9: 0
2. FEELING DOWN, DEPRESSED OR HOPELESS: NOT AT ALL
1. LITTLE INTEREST OR PLEASURE IN DOING THINGS: NOT AT ALL
SUM OF ALL RESPONSES TO PHQ QUESTIONS 1-9: 0
1. LITTLE INTEREST OR PLEASURE IN DOING THINGS: 0
SUM OF ALL RESPONSES TO PHQ QUESTIONS 1-9: 0
SUM OF ALL RESPONSES TO PHQ QUESTIONS 1-9: 0
SUM OF ALL RESPONSES TO PHQ9 QUESTIONS 1 & 2: 0

## 2024-03-13 ASSESSMENT — ENCOUNTER SYMPTOMS
VOMITING: 0
ABDOMINAL DISTENTION: 1
SHORTNESS OF BREATH: 0
DIARRHEA: 0
COUGH: 0
ABDOMINAL PAIN: 0
NAUSEA: 0

## 2024-03-13 NOTE — ASSESSMENT & PLAN NOTE
Patient admits to not following a healthy diet and thinks this is the cause of her bloating.  Denies nausea, vomiting or diarrhea.  Exam benign today.  Discussed not eating greasy, fast food and switching to healthier diet to help with her abdominal symptoms as well as her diabetes.  Will recheck with labs in 3 months.   Detail Level: Simple Render In Strict Bullet Format?: No Initiate Treatment: OTC dandruff shampoo to face, let sit for a few minutes before rinsing off. For itchy irritated areas can use OTC hydrocortisone 1% cream BID

## 2024-03-13 NOTE — PATIENT INSTRUCTIONS
IT WAS GREAT TO SEE YOU TODAY!    WE WILL CHECK LABS WHEN YOU RETURN SO PLEASE COME FASTING (NOTHING TO EAT OR DRINK AFTER MIDNIGHT EXCEPT YOUR MEDICINE AND PLAIN WATER).      PLEASE WORK ON DIET - EAT MORE LEAN PROTEINS (CHICKEN, FISH, BEANS, TURKEY), FRUITS, VEGETABLES AND DRINK MORE WATER.  EAT LESS RED MEAT, DAIRY PRODUCTS, STARCHY FOODS (POTATOES, RICE, PASTA, BREAD, TORTILLAS, CHIPS, COOKIES, CAKES), SWEETS AND DRINK LESS SODA, LESS ENERGY DRINKS, LESS JUICE AND SWEET TEA.  TRY TO EAT THREE MEALS A DAY WITH SOME SORT OF PROTEIN AND TRY TO CUT BACK ON SNACKS (UNLESS IT IS HEALTHY - VEGGIES AND HUMMUS, ONE SERVING OF NUTS, ONE SERVING OF FRUIT, ETC).  PAY ATTENTION TO SERVING SIZES ON THE PACKAGES SO YOU DO NOT EAT LARGER PORTIONS.    CONSIDER TAKING A PROBIOTIC TO HELP WITH BLOATING.    PLEASE TAKE ALL MEDICATION AS DISCUSSED.    I WILL SEE YOU AGAIN IN 3 MONTHS BUT PLEASE CALL WITH CONCERNS [phone]

## 2024-03-13 NOTE — ASSESSMENT & PLAN NOTE
Last LDL very elevated at 177, patient wanted to work on healthy diet but admits that she has not been following a good diet.  Discussed rechecking in 3 months after she changes her diet - eating healthier foods and more regular exercise.  Will repeat lipids in 3 months.

## 2024-03-13 NOTE — ASSESSMENT & PLAN NOTE
Stable on Glipizide, though patient has not been following an appropriate diet.  Wishes to wait for labs, will refill her Glipizide and have her return in 3 months for labs.

## 2024-03-13 NOTE — PROGRESS NOTES
vomiting.  Denies fever.  Denies urinary symptoms.  Does not wish to do labs today as she would rather take 3 months to get back to a healthy diet before seeing what her A1C and cholesterol look like.    PAST MEDICAL HISTORY    Past Medical History:   Diagnosis Date    Abnormal Papanicolaou smear of cervix     Endometriosis     Palpitation     started on Metoprolol- Holter monitor and ECHO ordered    Type 2 diabetes mellitus without complication (HCC)     oral agent/Pt doesnt monitor BS/Pt not aware of BS reading when experiencing s.s of hypoglycemia/Last A1c:5.8 on 2.28.23       PAST SURGICAL HISTORY    Past Surgical History:   Procedure Laterality Date    COLONOSCOPY N/A 10/6/2023    COLONOSCOPY, POLYPECTOMY performed by Leon Dempsey MD at [facility] ENDOSCOPY    OVARY REMOVAL Right     SALPINGECTOMY Left        FAMILY HISTORY    Family History   Problem Relation Age of Onset    Breast Cancer Mother 70    Diabetes Mother     Heart Attack Mother     Diabetes type 2  Sister     Diabetes Sister     Diabetes Sister        SOCIAL HISTORY    Social History     Socioeconomic History    Marital status: Unknown     Spouse name: None    Number of children: None    Years of education: None    Highest education level: None   Tobacco Use    Smoking status: Never    Smokeless tobacco: Never   Vaping Use    Vaping Use: Never used   Substance and Sexual Activity    Alcohol use: Yes     Alcohol/week: 1.0 standard drink of alcohol     Types: 1 Glasses of wine per week     Comment: occasionally    Drug use: Never    Sexual activity: Yes     Partners: Male     Birth control/protection: None     Social Determinants of Health     Financial Resource Strain: Patient Declined (3/13/2024)    Overall Financial Resource Strain (CARDIA)     Difficulty of Paying Living Expenses: Patient declined   Food Insecurity: Patient Declined (3/13/2024)    Hunger Vital Sign     Worried About Running Out of Food in the Last Year: Patient declined     Ran

## 2024-06-25 ENCOUNTER — TELEPHONE (OUTPATIENT)
Dept: PRIMARY CARE CLINIC | Facility: CLINIC | Age: 59
End: 2024-06-25

## 2024-06-25 NOTE — TELEPHONE ENCOUNTER
Patient called requesting a refill for the following:     LEVEMIR FLEXTOUCH 100 UNIT/ML injection pen [[id number]]     [place] to:  [id number] [place] - [address] [place], [place] [address] - P [phone] - F [phone]

## 2024-06-27 ENCOUNTER — OFFICE VISIT (OUTPATIENT)
Dept: PRIMARY CARE CLINIC | Facility: CLINIC | Age: 59
End: 2024-06-27
Payer: COMMERCIAL

## 2024-06-27 ENCOUNTER — TELEPHONE (OUTPATIENT)
Dept: PRIMARY CARE CLINIC | Facility: CLINIC | Age: 59
End: 2024-06-27

## 2024-06-27 VITALS
HEART RATE: 74 BPM | HEIGHT: 63 IN | BODY MASS INDEX: 28.88 KG/M2 | DIASTOLIC BLOOD PRESSURE: 76 MMHG | SYSTOLIC BLOOD PRESSURE: 134 MMHG | OXYGEN SATURATION: 99 % | TEMPERATURE: 97.4 F | WEIGHT: 163 LBS

## 2024-06-27 DIAGNOSIS — Z13.29 SCREENING FOR THYROID DISORDER: ICD-10-CM

## 2024-06-27 DIAGNOSIS — E11.9 TYPE 2 DIABETES MELLITUS WITHOUT COMPLICATION, WITHOUT LONG-TERM CURRENT USE OF INSULIN (HCC): Primary | ICD-10-CM

## 2024-06-27 DIAGNOSIS — E78.00 ELEVATED LDL CHOLESTEROL LEVEL: ICD-10-CM

## 2024-06-27 PROCEDURE — 99214 OFFICE O/P EST MOD 30 MIN: CPT | Performed by: FAMILY MEDICINE

## 2024-06-27 RX ORDER — SEMAGLUTIDE 1.34 MG/ML
1 INJECTION, SOLUTION SUBCUTANEOUS WEEKLY
Qty: 1.5 ML | Refills: 0 | Status: SHIPPED | OUTPATIENT
Start: 2024-06-27

## 2024-06-27 ASSESSMENT — ENCOUNTER SYMPTOMS
DIARRHEA: 0
ABDOMINAL PAIN: 0
SHORTNESS OF BREATH: 0
NAUSEA: 0
COUGH: 0
VOMITING: 0

## 2024-06-27 NOTE — PROGRESS NOTES
Difficulty of Paying Living Expenses: Patient declined   Food Insecurity: Patient Declined (3/13/2024)    Hunger Vital Sign     Worried About Running Out of Food in the Last Year: Patient declined     Ran Out of Food in the Last Year: Patient declined   Transportation Needs: Unknown (3/13/2024)    PRAPARE - Transportation     Lack of Transportation (Non-Medical): Patient declined   Housing Stability: Unknown (3/13/2024)    Housing Stability Vital Sign     Unstable Housing in the Last Year: Patient declined       MEDICATIONS      Current Outpatient Medications:     insulin detemir (LEVEMIR FLEXTOUCH) 100 UNIT/ML injection pen, Inject 10 Units into the skin nightly, Disp: 5 Adjustable Dose Pre-filled Pen Syringe, Rfl: 5    Semaglutide, 1 MG/DOSE, (OZEMPIC, 1 MG/DOSE,) 2 MG/1.5ML SOPN, Inject 1 mg into the skin once a week, Disp: 1.5 mL, Rfl: 0    glipiZIDE (GLUCOTROL) 5 MG tablet, Take 1 tablet by mouth 2 times daily, Disp: 180 tablet, Rfl: 3    metoprolol succinate (TOPROL XL) 25 MG extended release tablet, Take 0.5 tablets by mouth daily, Disp: 45 tablet, Rfl: 3    ALLERGIES / INTOLERANCES    No Known Allergies    REVIEW OF SYSTEMS    Review of Systems   Constitutional:  Negative for fever.   HENT:  Negative for congestion.    Eyes:  Positive for visual disturbance.   Respiratory:  Negative for cough and shortness of breath.    Cardiovascular:  Negative for chest pain.   Gastrointestinal:  Negative for abdominal pain, diarrhea, nausea and vomiting.   Neurological:  Positive for headaches.   Psychiatric/Behavioral:  Negative for dysphoric mood.           PHYSICAL EXAMINATION    Vitals:    06/27/24 1358   BP: 134/76   Pulse: 74   Temp: 97.4 °F (36.3 °C)   SpO2: 99%         Physical Exam  Vitals and nursing note reviewed.   Constitutional:       General: She is not in acute distress.     Appearance: Normal appearance.   HENT:      Head: Normocephalic and atraumatic.      Right Ear: External ear normal.      Left Ear:

## 2024-06-27 NOTE — PATIENT INSTRUCTIONS
IT WAS GREAT TO SEE YOU TODAY!    I WILL CONTACT YOU WITH THE RESULTS OF YOUR LABS.    PLEASE TAKE ALL MEDICATION AS DISCUSSED.    ~START TAKING THE OZEMPIC 0.25 MG ONCE A WEEK FOR FOUR WEEKS.    ~AFTER THAT, INCREASE TO 0.5 MG ONCE A WEEK FOR 4-6 WEEKS, DEPENDING ON HOW LONG IT TAKES TO GET INSURANCE TO APPROVE THE 1 MG DOSE.    ~CONTINUE YOUR GLIPIZIDE.    ~USE THE LEVEMIR ON DAYS WHERE YOUR SUGAR IS VERY HIGH.  HOPEFULLY WE CAN STOP THIS ONCE THE OZEMPIC STARTS WORKING.    I WILL SEE YOU AGAIN IN 4 WEEKS BUT PLEASE CALL WITH CONCERNS [phone]

## 2024-06-27 NOTE — TELEPHONE ENCOUNTER
Mary Kay (pharmacist) from [facility] is need clarification for the insulin detemir (LEVEMIR FLEXTOUCH) 100 UNIT/ML injection pen [[id number]].     Wants to know for the quantity if it was meant to be 5 boxes or 5 pens, etc.     Call back : [phone]

## 2024-06-27 NOTE — ASSESSMENT & PLAN NOTE
Chronic, worsening due to patient having to travel a lot, has been using leftover insulin and requested a refill.  Denies feeling sick, thinks its just due to her schedule and not being able to stick to a healthy diet.  Will order labs, given two Ozempic samples to help with blood sugar control.  Discussed taking 0.25 mg once a week for four weeks, then 0.5 mg once a week for four weeks.  Will send Ozempic 1 mg to the pharmacy to start the PA process.  Agree to refill the Levemir Flextouch temporarily, hoping to get her off of this once the Ozempic kicks in.  Patient has follow up appointment in one month, will have labs done then.

## 2024-07-01 ENCOUNTER — TELEPHONE (OUTPATIENT)
Dept: PRIMARY CARE CLINIC | Facility: CLINIC | Age: 59
End: 2024-07-01

## 2024-08-06 ENCOUNTER — OFFICE VISIT (OUTPATIENT)
Dept: PRIMARY CARE CLINIC | Facility: CLINIC | Age: 59
End: 2024-08-06
Payer: COMMERCIAL

## 2024-08-06 VITALS
OXYGEN SATURATION: 96 % | DIASTOLIC BLOOD PRESSURE: 80 MMHG | SYSTOLIC BLOOD PRESSURE: 118 MMHG | HEIGHT: 63 IN | BODY MASS INDEX: 27.82 KG/M2 | TEMPERATURE: 97.2 F | HEART RATE: 74 BPM | WEIGHT: 157 LBS

## 2024-08-06 DIAGNOSIS — Z12.31 SCREENING MAMMOGRAM FOR BREAST CANCER: ICD-10-CM

## 2024-08-06 DIAGNOSIS — R14.0 ABDOMINAL BLOATING: ICD-10-CM

## 2024-08-06 DIAGNOSIS — E11.9 TYPE 2 DIABETES MELLITUS WITHOUT COMPLICATION, WITHOUT LONG-TERM CURRENT USE OF INSULIN (HCC): Primary | ICD-10-CM

## 2024-08-06 DIAGNOSIS — D64.9 ANEMIA, UNSPECIFIED TYPE: ICD-10-CM

## 2024-08-06 DIAGNOSIS — Z13.29 SCREENING FOR THYROID DISORDER: ICD-10-CM

## 2024-08-06 DIAGNOSIS — E78.00 ELEVATED LDL CHOLESTEROL LEVEL: ICD-10-CM

## 2024-08-06 LAB
ALBUMIN SERPL-MCNC: 4.2 G/DL (ref 3.5–5)
ALBUMIN/GLOB SERPL: 1.4 (ref 1–1.9)
ALP SERPL-CCNC: 105 U/L (ref 35–104)
ALT SERPL-CCNC: 20 U/L (ref 12–65)
ANION GAP SERPL CALC-SCNC: 11 MMOL/L (ref 9–18)
AST SERPL-CCNC: 27 U/L (ref 15–37)
BILIRUB SERPL-MCNC: 0.5 MG/DL (ref 0–1.2)
BUN SERPL-MCNC: 14 MG/DL (ref 6–23)
CALCIUM SERPL-MCNC: 9.4 MG/DL (ref 8.8–10.2)
CHLORIDE SERPL-SCNC: 109 MMOL/L (ref 98–107)
CHOLEST SERPL-MCNC: 225 MG/DL (ref 0–200)
CO2 SERPL-SCNC: 25 MMOL/L (ref 20–28)
CREAT SERPL-MCNC: 0.84 MG/DL (ref 0.6–1.1)
CREAT UR-MCNC: 240 MG/DL (ref 28–217)
EST. AVERAGE GLUCOSE BLD GHB EST-MCNC: 161 MG/DL
GLOBULIN SER CALC-MCNC: 3 G/DL (ref 2.3–3.5)
GLUCOSE SERPL-MCNC: 88 MG/DL (ref 70–99)
HBA1C MFR BLD: 7.3 % (ref 0–5.6)
HDLC SERPL-MCNC: 45 MG/DL (ref 40–60)
HDLC SERPL: 5 (ref 0–5)
LDLC SERPL CALC-MCNC: 160 MG/DL (ref 0–100)
MICROALBUMIN UR-MCNC: <1.2 MG/DL (ref 0–20)
POTASSIUM SERPL-SCNC: 4.5 MMOL/L (ref 3.5–5.1)
PROT SERPL-MCNC: 7.1 G/DL (ref 6.3–8.2)
SODIUM SERPL-SCNC: 145 MMOL/L (ref 136–145)
TRIGL SERPL-MCNC: 104 MG/DL (ref 0–150)
TSH, 3RD GENERATION: 0.66 UIU/ML (ref 0.27–4.2)
VLDLC SERPL CALC-MCNC: 21 MG/DL (ref 6–23)

## 2024-08-06 PROCEDURE — 99214 OFFICE O/P EST MOD 30 MIN: CPT | Performed by: FAMILY MEDICINE

## 2024-08-06 RX ORDER — SEMAGLUTIDE 1.34 MG/ML
1 INJECTION, SOLUTION SUBCUTANEOUS WEEKLY
Qty: 1.5 ML | Refills: 5 | Status: SHIPPED | OUTPATIENT
Start: 2024-08-06

## 2024-08-06 ASSESSMENT — ENCOUNTER SYMPTOMS
DIARRHEA: 0
COUGH: 0
ABDOMINAL PAIN: 0
NAUSEA: 0
VOMITING: 0
SHORTNESS OF BREATH: 0

## 2024-08-06 ASSESSMENT — PATIENT HEALTH QUESTIONNAIRE - PHQ9
SUM OF ALL RESPONSES TO PHQ9 QUESTIONS 1 & 2: 0
SUM OF ALL RESPONSES TO PHQ QUESTIONS 1-9: 0
1. LITTLE INTEREST OR PLEASURE IN DOING THINGS: NOT AT ALL
2. FEELING DOWN, DEPRESSED OR HOPELESS: NOT AT ALL

## 2024-08-06 NOTE — PATIENT INSTRUCTIONS
IT WAS GREAT TO SEE YOU TODAY!  YOU LOOK GREAT, KEEP UP THE GOOD WORK!    I WILL CONTACT YOU WITH THE RESULTS OF YOUR LABS.    PLEASE TAKE ALL MEDICATION AS DISCUSSED.    ~CONTINUE THE OZEMPIC ONCE A WEEK.  I WILL SEND REFILLS.    ~USE THE GLIPIZIDE, EITHER A FULL TABLET IN THE MORNING WITH BREAKFAST OR HALF A TABLET IF YOUR SUGARS ARE A LITTLE HIGH, TO MAINTAIN GOOD BLOOD SUGAR CONTROL.    ~ONLY USE THE LEVEMIR IF YOUR BLOOD SUGARS START SPIKING.    DON'T FORGET TO CALL [facility] TO SCHEDULE YOUR MAMMOGRAM!  THEIR PHONE NUMBER [phone]    I WILL SEE YOU AGAIN IN 6 MONTHS BUT PLEASE CALL WITH CONCERNS [phone]

## 2024-08-06 NOTE — PROGRESS NOTES
[facility]  Vicenta Lewis Edgar, DO  [address], [address]  [place] [id number] [phone]         ASSESSMENT AND PLAN    Problem List Items Addressed This Visit          Endocrine    Diabetes mellitus (HCC) - Primary     Chronic, stable on 1 mg of Ozempic, rarely taking her Glipizide and Levemir.  Plan to check labs today, discussed possibly using a half dose of Glipizide if her sugars creep up.  Will recheck in six months.         Relevant Medications    Semaglutide, 1 MG/DOSE, (OZEMPIC, 1 MG/DOSE,) 2 MG/1.5ML SOPN       Other    Screening mammogram for breast cancer    Relevant Orders    [facility] DIGITAL SCREEN W OR WO CAD BILATERAL    Anemia      Previous issue will repeat today.         Elevated LDL cholesterol level      Chronic, stable, not on a statin.  Will check lipids today.         Abdominal bloating      Chronic issue, now a little worse on Ozempic but is not causing constipation.  Usually diet related.  Will continue to monitor.          Other Visit Diagnoses       Screening for thyroid disorder                 The diagnoses and plan were discussed with the patient, who verbalizes understanding and agrees with plan.  All questions answered.    Chief Complaint    Chief Complaint   Patient presents with    3 Month Follow-Up         HISTORY OF PRESENT ILLNESS    58 y.o. female with DM presents today for follow up.  Notes intermittent headaches in the evenings, sharp pain in the front of her right scalp.  Does not affect her vision or make her nauseated.  Feels like a pulsating and then it goes away.  Often when she gets out of the shower.  Sometimes its when she is bending down.  States that she was having them more frequently when she was having a problem with her allergies.  Takes her Zyrtec daily.  Notes that the Ozempic causes mild bloating.  Notes nausea if she eats something fried.  Is not having any constipation.  Currently on 1 mg.  Stopped her Levemir about

## 2024-08-06 NOTE — ASSESSMENT & PLAN NOTE
Chronic issue, now a little worse on Ozempic but is not causing constipation.  Usually diet related.  Will continue to monitor.

## 2024-08-06 NOTE — ASSESSMENT & PLAN NOTE
Chronic, stable on 1 mg of Ozempic, rarely taking her Glipizide and Levemir.  Plan to check labs today, discussed possibly using a half dose of Glipizide if her sugars creep up.  Will recheck in six months.

## 2024-08-12 ENCOUNTER — PATIENT MESSAGE (OUTPATIENT)
Dept: PRIMARY CARE CLINIC | Facility: CLINIC | Age: 59
End: 2024-08-12

## 2024-08-12 RX ORDER — SEMAGLUTIDE 2.68 MG/ML
2 INJECTION, SOLUTION SUBCUTANEOUS
Qty: 3 ML | Refills: 5 | Status: SHIPPED | OUTPATIENT
Start: 2024-08-12

## 2024-08-22 ENCOUNTER — OFFICE VISIT (OUTPATIENT)
Dept: PRIMARY CARE CLINIC | Facility: CLINIC | Age: 59
End: 2024-08-22
Payer: COMMERCIAL

## 2024-08-22 VITALS
DIASTOLIC BLOOD PRESSURE: 89 MMHG | BODY MASS INDEX: 25.87 KG/M2 | SYSTOLIC BLOOD PRESSURE: 121 MMHG | HEART RATE: 49 BPM | WEIGHT: 146 LBS | OXYGEN SATURATION: 98 % | HEIGHT: 63 IN | TEMPERATURE: 97.2 F

## 2024-08-22 DIAGNOSIS — R19.7 DIARRHEA OF PRESUMED INFECTIOUS ORIGIN: Primary | ICD-10-CM

## 2024-08-22 PROCEDURE — 99213 OFFICE O/P EST LOW 20 MIN: CPT

## 2024-08-22 ASSESSMENT — ENCOUNTER SYMPTOMS
RESPIRATORY NEGATIVE: 1
COUGH: 0
NAUSEA: 0
CONSTIPATION: 0
CHEST TIGHTNESS: 0
DIARRHEA: 1
VOMITING: 0
SHORTNESS OF BREATH: 0
BLOOD IN STOOL: 0
EYES NEGATIVE: 1
ABDOMINAL PAIN: 0
ALLERGIC/IMMUNOLOGIC NEGATIVE: 1

## 2024-08-22 NOTE — PROGRESS NOTES
[facility]  Chichi \"Ale\" KIMBERLY Gold  [address] [facility], [place] [address]  [phone]         ASSESSMENT AND PLAN    Problem List Items Addressed This Visit       Diarrhea of presumed infectious origin - Primary     Acute onset 2 days ago non-bloody diarrhea accompanied by chills, decreased appetite & minor fatigue, but w/o abdominal pain, N/V or fever.   - advised unlikely related to increase in ozempic 2 weeks ago, but could be & to continue to monitor  - advised likely viral in nature. Advised to continue to increase oral intake as tolerates, to drinks lots of water or water cut 1/2 with electrolyte replacement solutions, & to take a probiotic. Advised to try loperamide as needed.   - advised to let us know, if develops new symptoms, especially if develops abdominal pain, or does not get better  - otherwise, has f/u scheduled for 2/6/2025               The diagnoses and plan were discussed with the patient, who verbalizes understanding and agrees with plan.  All questions answered.    Chief Complaint    Chief Complaint   Patient presents with    Diarrhea    Chills     Concerned about ozempic side effects          HISTORY OF PRESENT ILLNESS    58 y.o. female with DM2, h/o anemia, elevated LDL, & abdominal bloating presents today for acute care visit.     Last seen by Dr. Hernández 8/6/2024 for follow-up.    Patient presents today with diarrhea, cold chills, & minor fatigue. Started Tuesday night/ Wednesday morning. Monday had baked chicken for dinner. Tuesday had baked chicken & onions for dinner. Intense diarrhea yesterday morning. Rest of day stomach was moving around a lot. Had 1 slice of pizza because just wasn't hungry. 1 bout this morning. Denies abdominal pain or N/V. Denies blood in diarrhea. Has not happened before. Hasn't tried any otc meds. Increased 0.5 to the 1 of ozempic 2 weeks ago. Had been having less BM's with ozempic since started, but now

## 2024-08-22 NOTE — ASSESSMENT & PLAN NOTE
Acute onset 2 days ago non-bloody diarrhea accompanied by chills, decreased appetite & minor fatigue, but w/o abdominal pain, N/V or fever.   - advised unlikely related to increase in ozempic 2 weeks ago, but could be & to continue to monitor  - advised likely viral in nature. Advised to continue to increase oral intake as tolerates, to drinks lots of water or water cut 1/2 with electrolyte replacement solutions, & to take a probiotic. Advised to try loperamide as needed.   - advised to let us know, if develops new symptoms, especially if develops abdominal pain, or does not get better  - otherwise, has f/u scheduled for 2/6/2025

## 2024-08-22 NOTE — PATIENT INSTRUCTIONS
IT WAS GREAT TO MEET YOU TODAY! HOPE YOU FEEL BETTER SOON    PLEASE TAKE ALL MEDICATION AS DISCUSSED. WEAR YOUR SEATBELT.   - TRY OVER THE COUNTER PROBIOTIC (E.G. ALIGN PROBIOTIC) TO HELP YOUR GUT MICROBIOME THROUGH THIS DIARRHEAL ILLNESS  - TRY IMMODIUM OVER THE COUNTER TO HELP MANAGE THE DIARRHEA  - DRINK LOTS OF WATER. TRY CUTTING ELECTROLYTE REPLACEMENT DRINKS (E.G. GATORADE) 1/2 WITH WATER. PROGRESS FOOD AS ABLE TO TOLERATE.     WE WILL SEE YOU AGAIN AT YOUR NEXT APPOINTMENT WITH DR. LANDERS 2/6/2025, BUT PLEASE SEND [employer] MESSAGE OR CALL WITH CONCERNS -[phone]

## 2024-08-23 RX ORDER — SEMAGLUTIDE 2.68 MG/ML
2 INJECTION, SOLUTION SUBCUTANEOUS
Qty: 3 ML | Refills: 5 | OUTPATIENT
Start: 2024-08-23

## 2024-10-17 RX ORDER — SEMAGLUTIDE 2.68 MG/ML
2 INJECTION, SOLUTION SUBCUTANEOUS
Qty: 3 ML | Refills: 5 | Status: SHIPPED | OUTPATIENT
Start: 2024-10-17

## 2024-11-15 RX ORDER — SEMAGLUTIDE 2.68 MG/ML
2 INJECTION, SOLUTION SUBCUTANEOUS
Qty: 3 ML | Refills: 5 | Status: SHIPPED | OUTPATIENT
Start: 2024-11-15

## 2025-01-07 RX ORDER — SEMAGLUTIDE 2.68 MG/ML
2 INJECTION, SOLUTION SUBCUTANEOUS
Qty: 3 ML | Refills: 5 | Status: SHIPPED | OUTPATIENT
Start: 2025-01-07

## 2025-02-10 RX ORDER — SEMAGLUTIDE 2.68 MG/ML
2 INJECTION, SOLUTION SUBCUTANEOUS
Qty: 3 ML | Refills: 5 | Status: SHIPPED | OUTPATIENT
Start: 2025-02-10

## 2025-03-11 ENCOUNTER — PATIENT MESSAGE (OUTPATIENT)
Dept: PRIMARY CARE CLINIC | Facility: CLINIC | Age: 60
End: 2025-03-11

## 2025-03-11 RX ORDER — FLUCONAZOLE 150 MG/1
150 TABLET ORAL ONCE
Qty: 1 TABLET | Refills: 0 | Status: SHIPPED | OUTPATIENT
Start: 2025-03-11 | End: 2025-03-11

## 2025-04-04 SDOH — ECONOMIC STABILITY: FOOD INSECURITY: WITHIN THE PAST 12 MONTHS, THE FOOD YOU BOUGHT JUST DIDN'T LAST AND YOU DIDN'T HAVE MONEY TO GET MORE.: NEVER TRUE

## 2025-04-04 SDOH — ECONOMIC STABILITY: TRANSPORTATION INSECURITY
IN THE PAST 12 MONTHS, HAS LACK OF TRANSPORTATION KEPT YOU FROM MEETINGS, WORK, OR FROM GETTING THINGS NEEDED FOR DAILY LIVING?: NO

## 2025-04-04 SDOH — ECONOMIC STABILITY: FOOD INSECURITY: WITHIN THE PAST 12 MONTHS, YOU WORRIED THAT YOUR FOOD WOULD RUN OUT BEFORE YOU GOT MONEY TO BUY MORE.: NEVER TRUE

## 2025-04-04 SDOH — ECONOMIC STABILITY: INCOME INSECURITY: IN THE LAST 12 MONTHS, WAS THERE A TIME WHEN YOU WERE NOT ABLE TO PAY THE MORTGAGE OR RENT ON TIME?: NO

## 2025-04-04 SDOH — ECONOMIC STABILITY: TRANSPORTATION INSECURITY
IN THE PAST 12 MONTHS, HAS THE LACK OF TRANSPORTATION KEPT YOU FROM MEDICAL APPOINTMENTS OR FROM GETTING MEDICATIONS?: NO

## 2025-04-04 ASSESSMENT — PATIENT HEALTH QUESTIONNAIRE - PHQ9
SUM OF ALL RESPONSES TO PHQ QUESTIONS 1-9: 0
SUM OF ALL RESPONSES TO PHQ QUESTIONS 1-9: 0
2. FEELING DOWN, DEPRESSED OR HOPELESS: NOT AT ALL
1. LITTLE INTEREST OR PLEASURE IN DOING THINGS: NOT AT ALL
SUM OF ALL RESPONSES TO PHQ QUESTIONS 1-9: 0
SUM OF ALL RESPONSES TO PHQ QUESTIONS 1-9: 0
2. FEELING DOWN, DEPRESSED OR HOPELESS: NOT AT ALL
1. LITTLE INTEREST OR PLEASURE IN DOING THINGS: NOT AT ALL
SUM OF ALL RESPONSES TO PHQ9 QUESTIONS 1 & 2: 0

## 2025-04-07 ENCOUNTER — OFFICE VISIT (OUTPATIENT)
Dept: PRIMARY CARE CLINIC | Facility: CLINIC | Age: 60
End: 2025-04-07
Payer: COMMERCIAL

## 2025-04-07 VITALS
SYSTOLIC BLOOD PRESSURE: 122 MMHG | DIASTOLIC BLOOD PRESSURE: 74 MMHG | HEART RATE: 71 BPM | OXYGEN SATURATION: 99 % | HEIGHT: 63 IN | WEIGHT: 148 LBS | BODY MASS INDEX: 26.22 KG/M2 | TEMPERATURE: 98.3 F

## 2025-04-07 DIAGNOSIS — D64.9 ANEMIA, UNSPECIFIED TYPE: ICD-10-CM

## 2025-04-07 DIAGNOSIS — R20.2 PARESTHESIA OF LEFT FOOT: ICD-10-CM

## 2025-04-07 DIAGNOSIS — E78.00 ELEVATED LDL CHOLESTEROL LEVEL: ICD-10-CM

## 2025-04-07 DIAGNOSIS — E11.9 TYPE 2 DIABETES MELLITUS WITHOUT COMPLICATION, WITHOUT LONG-TERM CURRENT USE OF INSULIN: Primary | ICD-10-CM

## 2025-04-07 DIAGNOSIS — K59.03 DRUG-INDUCED CONSTIPATION: ICD-10-CM

## 2025-04-07 PROCEDURE — 99214 OFFICE O/P EST MOD 30 MIN: CPT | Performed by: FAMILY MEDICINE

## 2025-04-07 RX ORDER — GLIPIZIDE 5 MG/1
5 TABLET ORAL 2 TIMES DAILY
Qty: 180 TABLET | Refills: 3 | Status: SHIPPED | OUTPATIENT
Start: 2025-04-07

## 2025-04-07 ASSESSMENT — ENCOUNTER SYMPTOMS
DIARRHEA: 0
VOMITING: 0
SHORTNESS OF BREATH: 0
ABDOMINAL PAIN: 0
COUGH: 0
NAUSEA: 0

## 2025-04-07 NOTE — PROGRESS NOTES
Behavior: Behavior normal.         Visual inspection:  Deformity/amputation: absent  Skin lesions/pre-ulcerative calluses: absent  Edema: right- negative, left- negative    Sensory exam:  Monofilament sensation: normal  (minimum of 5 random plantar locations tested, avoiding callused areas - > 1 area with absence of sensation is + for neuropathy)    Plus at least one of the following:  Pulses: normal,   Pinprick: Intact  Proprioception: Intact  Vibration (128 Hz): Intact      RESULTS    Lab Results   Component Value Date    CHOL 225 (H) 08/06/2024    CHOL 242 (H) 02/28/2023     Lab Results   Component Value Date    TRIG 104 08/06/2024    TRIG 79 02/28/2023     Lab Results   Component Value Date    HDL 45 08/06/2024    HDL 49 02/28/2023     Lab Results   Component Value Date     (H) 08/06/2024    .2 (H) 02/28/2023     Lab Results   Component Value Date    VLDL 21 08/06/2024    VLDL 15.8 02/28/2023     Lab Results   Component Value Date    CHOLHDLRATIO 5.0 08/06/2024    CHOLHDLRATIO 4.9 02/28/2023     Hemoglobin A1C   Date Value Ref Range Status   08/06/2024 7.3 (H) 0 - 5.6 % Final     Comment:     Reference Range  Normal       <5.7%  Prediabetes  5.7-6.4%  Diabetes     >6.4%       Lab Results   Component Value Date     08/06/2024    K 4.5 08/06/2024     (H) 08/06/2024    CO2 25 08/06/2024    BUN 14 08/06/2024    CREATININE 0.84 08/06/2024    GLUCOSE 88 08/06/2024    CALCIUM 9.4 08/06/2024    BILITOT 0.5 08/06/2024    ALKPHOS 105 (H) 08/06/2024    AST 27 08/06/2024    ALT 20 08/06/2024    LABGLOM 82 08/06/2024    GFRAA >60 02/26/2021    AGRATIO 1.0 (L) 02/26/2021    GLOB 3.0 08/06/2024       Lab Results   Component Value Date    ALBCREAT  08/06/2024     Cannot calculate ratio due to microalbumin result outside reportable range.     Lab Results   Component Value Date    TSH 0.657 08/06/2024           Vicenta Hernández DO  11:12 AM  04/07/25

## 2025-04-07 NOTE — PATIENT INSTRUCTIONS
IT WAS GREAT TO SEE YOU TODAY!    I WILL CONTACT YOU WITH THE RESULTS OF YOUR LABS.    PLEASE TAKE ALL MEDICATION AS DISCUSSED.    I WILL SEE YOU AGAIN IN 6 MONTHS BUT PLEASE CALL WITH CONCERNS [phone]

## 2025-04-07 NOTE — ASSESSMENT & PLAN NOTE
Chronic, stable on 1 mg of Ozempic.  Normal diabetic foot exam, last diabetic eye exam was normal in June 2024.  Will order labs.  Patient only sometimes taking Glipizide, if her A1C looks good we will stop the Glipizide, but will send refill for now.

## 2025-04-07 NOTE — ASSESSMENT & PLAN NOTE
New problem, patient with change in her foot feeling, mostly at night.  Stands all day at work.  Denies injuries or swelling.  Will check labs.  Normal diabetic foot exam.

## 2025-04-28 RX ORDER — SEMAGLUTIDE 2.68 MG/ML
2 INJECTION, SOLUTION SUBCUTANEOUS
Qty: 3 ML | Refills: 5 | Status: SHIPPED | OUTPATIENT
Start: 2025-04-28

## (undated) DEVICE — TRAP SPEC POLYP REM STRNR CLN DSGN MAGNIFYING WIND DISP

## (undated) DEVICE — ENDOSCOPIC KIT 1.1+ OP4 CA DE 2 GWN AAMI LEVEL 3

## (undated) DEVICE — FORCEPS BX L240CM JAW DIA2.8MM L CAP W/ NDL MIC MESH TOOTH

## (undated) DEVICE — SYRINGE MED 5ML STD CLR PLAS LUERLOCK TIP N CTRL DISP

## (undated) DEVICE — ELECTRODE PT RET AD L9FT HI MOIST COND ADH HYDRGEL CORDED

## (undated) DEVICE — SYRINGE MED 3ML CLR PLAS STD N CTRL LUERLOCK TIP DISP

## (undated) DEVICE — NEEDLE SYR 18GA L1.5IN RED PLAS HUB S STL BLNT FILL W/O

## (undated) DEVICE — CANNULA NSL ORAL AD FOR CAPNOFLEX CO2 O2 AIRLFE

## (undated) DEVICE — CONNECTOR TBNG OD5-7MM O2 END DISP

## (undated) DEVICE — CONTAINER FORMALIN PREFILLED 10% NBF 60ML

## (undated) DEVICE — KENDALL RADIOLUCENT FOAM MONITORING ELECTRODE RECTANGULAR SHAPE: Brand: KENDALL

## (undated) DEVICE — SNARE POLYP SM W13MMXL240CM SHTH DIA2.4MM OVL FLX DISP

## (undated) DEVICE — GLOVE SURG SZ 75 CRM LTX FREE POLYISOPRENE POLYMER BEAD ANTI